# Patient Record
Sex: MALE | Employment: FULL TIME | URBAN - METROPOLITAN AREA
[De-identification: names, ages, dates, MRNs, and addresses within clinical notes are randomized per-mention and may not be internally consistent; named-entity substitution may affect disease eponyms.]

---

## 2016-07-20 LAB
CREATININE, EXTERNAL: 0.98
LDL-C, EXTERNAL: 124

## 2018-10-31 ENCOUNTER — OFFICE VISIT (OUTPATIENT)
Dept: INTERNAL MEDICINE CLINIC | Age: 38
End: 2018-10-31

## 2018-10-31 VITALS
OXYGEN SATURATION: 97 % | SYSTOLIC BLOOD PRESSURE: 110 MMHG | HEART RATE: 68 BPM | DIASTOLIC BLOOD PRESSURE: 80 MMHG | RESPIRATION RATE: 19 BRPM | WEIGHT: 191.2 LBS | BODY MASS INDEX: 30.01 KG/M2 | TEMPERATURE: 97.9 F | HEIGHT: 67 IN

## 2018-10-31 DIAGNOSIS — Z87.19 HISTORY OF FATTY INFILTRATION OF LIVER: ICD-10-CM

## 2018-10-31 DIAGNOSIS — Z13.1 DIABETES MELLITUS SCREENING: ICD-10-CM

## 2018-10-31 DIAGNOSIS — Z13.29 THYROID DISORDER SCREEN: ICD-10-CM

## 2018-10-31 DIAGNOSIS — Z13.220 LIPID SCREENING: ICD-10-CM

## 2018-10-31 DIAGNOSIS — Z76.89 ENCOUNTER TO ESTABLISH CARE: Primary | ICD-10-CM

## 2018-10-31 NOTE — PROGRESS NOTES
New Patient Evaluation Joe Griffin is a 34 y.o. male. They are here to establish care with the group and me as a primary care provider. He has not seen a physician in several years. Recently moved to [place] from [facility]. He had fatty liver in 2010. Triglyceride levels were high as well. This has not been rechecked in some time. He normally gets the flu shot. He is playing tennis 2-3 a week. Trying to work out more. He has a family history of gout (uncles) Patient Active Problem List  
 Diagnosis Date Noted  History of fatty infiltration of liver 10/31/2018 No Known Allergies History reviewed. No pertinent past medical history. History reviewed. No pertinent surgical history. Family History Problem Relation Age of Onset  Hypertension Mother  Liver Disease Father Social History Tobacco Use  Smoking status: Never Smoker  Smokeless tobacco: Never Used Substance Use Topics  Alcohol use: No  
  Frequency: Never Health Maintenance Topic Date Due  
 DTaP/Tdap/Td series (1 - Tdap) 07/14/1999  Influenza Age 5 to Adult  08/01/2018 Review of Systems Constitutional: Negative. Respiratory: Negative. Cardiovascular: Negative. Gastrointestinal: Negative. Visit Vitals /80 (BP 1 Location: Right arm, BP Patient Position: Sitting) Pulse 68 Temp 97.9 °F (36.6 °C) (Oral) Resp 19 Ht 5' 6.58\" (1.691 m) Wt 191 lb 3.2 oz (86.7 kg) SpO2 97% BMI 30.33 kg/m² Physical Exam  
Constitutional: No distress. Cardiovascular: Normal rate and regular rhythm. No murmur heard. Pulmonary/Chest: Effort normal and breath sounds normal.  
 
 
 
 
ASSESSMENT/PLAN Diagnoses and all orders for this visit: 1. Encounter to establish care 2. History of fatty infiltration of liver 3. Diabetes mellitus screening 
-     HEMOGLOBIN A1C WITH EAG 4.  Lipid screening 
-     CBC WITH AUTOMATED DIFF 
 -     METABOLIC PANEL, COMPREHENSIVE 
-     LIPID PANEL 5. Thyroid disorder screen 
-     TSH 3RD GENERATION Follow-up Disposition: 
Return in about 3 months (around 1/31/2019) for Full Physical - 30 minutes appointment. 
 
-Discussed with the patient to continue the current plan of care. We will obtain baseline labwork and determine if any adjustments need to be done. We will also await the records of the previous PCP to ascertain further details of the patient's history. The patient agrees with and understands the plan of care. All questions have been answered.

## 2019-01-22 LAB
ALBUMIN SERPL-MCNC: 4.6 G/DL (ref 3.5–5.5)
ALBUMIN/GLOB SERPL: 1.6 {RATIO} (ref 1.2–2.2)
ALP SERPL-CCNC: 105 IU/L (ref 39–117)
ALT SERPL-CCNC: 97 IU/L (ref 0–44)
AST SERPL-CCNC: 41 IU/L (ref 0–40)
BASOPHILS # BLD AUTO: 0 X10E3/UL (ref 0–0.2)
BASOPHILS NFR BLD AUTO: 1 %
BILIRUB SERPL-MCNC: 0.4 MG/DL (ref 0–1.2)
BUN SERPL-MCNC: 18 MG/DL (ref 6–24)
BUN/CREAT SERPL: 18 (ref 9–20)
CALCIUM SERPL-MCNC: 9.2 MG/DL (ref 8.7–10.2)
CHLORIDE SERPL-SCNC: 104 MMOL/L (ref 96–106)
CHOLEST SERPL-MCNC: 205 MG/DL (ref 100–199)
CO2 SERPL-SCNC: 25 MMOL/L (ref 20–29)
CREAT SERPL-MCNC: 0.98 MG/DL (ref 0.76–1.27)
EOSINOPHIL # BLD AUTO: 0.2 X10E3/UL (ref 0–0.4)
EOSINOPHIL NFR BLD AUTO: 2 %
ERYTHROCYTE [DISTWIDTH] IN BLOOD BY AUTOMATED COUNT: 13.9 % (ref 12.3–15.4)
EST. AVERAGE GLUCOSE BLD GHB EST-MCNC: 126 MG/DL
GLOBULIN SER CALC-MCNC: 2.9 G/DL (ref 1.5–4.5)
GLUCOSE SERPL-MCNC: 150 MG/DL (ref 65–99)
HBA1C MFR BLD: 6 % (ref 4.8–5.6)
HCT VFR BLD AUTO: 46.6 % (ref 37.5–51)
HDLC SERPL-MCNC: 28 MG/DL
HGB BLD-MCNC: 15.9 G/DL (ref 13–17.7)
IMM GRANULOCYTES # BLD AUTO: 0 X10E3/UL (ref 0–0.1)
IMM GRANULOCYTES NFR BLD AUTO: 0 %
LDLC SERPL CALC-MCNC: ABNORMAL MG/DL (ref 0–99)
LYMPHOCYTES # BLD AUTO: 2.3 X10E3/UL (ref 0.7–3.1)
LYMPHOCYTES NFR BLD AUTO: 33 %
MCH RBC QN AUTO: 29.7 PG (ref 26.6–33)
MCHC RBC AUTO-ENTMCNC: 34.1 G/DL (ref 31.5–35.7)
MCV RBC AUTO: 87 FL (ref 79–97)
MONOCYTES # BLD AUTO: 0.5 X10E3/UL (ref 0.1–0.9)
MONOCYTES NFR BLD AUTO: 8 %
NEUTROPHILS # BLD AUTO: 3.9 X10E3/UL (ref 1.4–7)
NEUTROPHILS NFR BLD AUTO: 56 %
PLATELET # BLD AUTO: 164 X10E3/UL (ref 150–379)
POTASSIUM SERPL-SCNC: 4.1 MMOL/L (ref 3.5–5.2)
PROT SERPL-MCNC: 7.5 G/DL (ref 6–8.5)
RBC # BLD AUTO: 5.35 X10E6/UL (ref 4.14–5.8)
SODIUM SERPL-SCNC: 142 MMOL/L (ref 134–144)
TRIGL SERPL-MCNC: 416 MG/DL (ref 0–149)
TSH SERPL DL<=0.005 MIU/L-ACNC: 1.9 UIU/ML (ref 0.45–4.5)
VLDLC SERPL CALC-MCNC: ABNORMAL MG/DL (ref 5–40)
WBC # BLD AUTO: 6.9 X10E3/UL (ref 3.4–10.8)

## 2019-01-29 ENCOUNTER — OFFICE VISIT (OUTPATIENT)
Dept: INTERNAL MEDICINE CLINIC | Age: 39
End: 2019-01-29

## 2019-01-29 VITALS
WEIGHT: 190.6 LBS | HEART RATE: 62 BPM | DIASTOLIC BLOOD PRESSURE: 90 MMHG | OXYGEN SATURATION: 97 % | RESPIRATION RATE: 17 BRPM | HEIGHT: 67 IN | SYSTOLIC BLOOD PRESSURE: 110 MMHG | BODY MASS INDEX: 29.91 KG/M2 | TEMPERATURE: 97.9 F

## 2019-01-29 DIAGNOSIS — Z00.00 WELL ADULT EXAM: Primary | ICD-10-CM

## 2019-01-29 DIAGNOSIS — Z87.19 HISTORY OF FATTY INFILTRATION OF LIVER: ICD-10-CM

## 2019-01-29 DIAGNOSIS — R73.03 PREDIABETES: ICD-10-CM

## 2019-01-29 DIAGNOSIS — E78.1 HYPERTRIGLYCERIDEMIA: ICD-10-CM

## 2019-01-29 NOTE — PROGRESS NOTES
Comprehensive Physical Examination    William High is a 34 y.o. male. he presents for a comprehensive physical examination. The patient reports feeling generally well today. He reports having tried to change his lifestyle in regards to diet and exercise. He has not completely made this transition however. We reviewed the lab work he had obtained prior to the visit. He has a very elevated triglyceride level in the 400s. He does have a history of hypertriglyceridemia. He also has a prediabetic A1c of 6.0. Given his history of fatty liver, he does have mild elevation of the transaminases. Other lab testing is within normal limits. Patient Active Problem List    Diagnosis Date Noted    History of fatty infiltration of liver 10/31/2018       No Known Allergies  History reviewed. No pertinent past medical history. History reviewed. No pertinent surgical history. Family History   Problem Relation Age of Onset    Hypertension Mother     Liver Disease Father      Social History     Tobacco Use    Smoking status: Never Smoker    Smokeless tobacco: Never Used   Substance Use Topics    Alcohol use: No     Frequency: Never        Health Maintenance   Topic Date Due    Influenza Age 5 to Adult  08/01/2018    DTaP/Tdap/Td series (2 - Td) 10/05/2026         Review of Systems   Constitutional: Negative. Respiratory: Negative. Cardiovascular: Negative. Gastrointestinal: Negative. Visit Vitals  /90 (BP 1 Location: Right arm, BP Patient Position: Sitting)   Pulse 62   Temp 97.9 °F (36.6 °C) (Oral)   Resp 17   Ht 5' 6.58\" (1.691 m)   Wt 190 lb 9.6 oz (86.5 kg)   SpO2 97%   BMI 30.23 kg/m²       Physical Exam   Constitutional: He is well-developed, well-nourished, and in no distress. HENT:   Mouth/Throat: Oropharynx is clear and moist.   Neck: Normal range of motion. Cardiovascular: Normal rate and regular rhythm. No murmur heard.   Pulmonary/Chest: Effort normal and breath sounds normal.   Abdominal: Soft. Bowel sounds are normal.   Musculoskeletal: He exhibits no edema. Lymphadenopathy:     He has no cervical adenopathy. ASSESSMENT/PLAN  Diagnoses and all orders for this visit:    1. Well adult exam    2. Prediabetes    3. History of fatty infiltration of liver    4. Hypertriglyceridemia    Discussed with the patient to continue healthy habits. He will minimize his intake of carbohydrates as well as attempt to increase his aerobic exercise. We discussed that this would help improve his fatty liver, his hyperglyceridemia as well as prediabetes. He understands this and will try to make these changes. Follow-up Disposition:  Return in about 6 months (around 7/29/2019) for Follow up.

## 2019-05-01 ENCOUNTER — OFFICE VISIT (OUTPATIENT)
Dept: INTERNAL MEDICINE CLINIC | Age: 39
End: 2019-05-01

## 2019-05-01 ENCOUNTER — HOSPITAL ENCOUNTER (OUTPATIENT)
Dept: GENERAL RADIOLOGY | Age: 39
Discharge: HOME | End: 2019-05-01
Attending: INTERNAL MEDICINE
Payer: COMMERCIAL

## 2019-05-01 VITALS
TEMPERATURE: 98.5 F | BODY MASS INDEX: 29.19 KG/M2 | OXYGEN SATURATION: 98 % | DIASTOLIC BLOOD PRESSURE: 80 MMHG | WEIGHT: 186 LBS | HEART RATE: 58 BPM | SYSTOLIC BLOOD PRESSURE: 108 MMHG | HEIGHT: 67 IN | RESPIRATION RATE: 19 BRPM

## 2019-05-01 DIAGNOSIS — M25.531 RIGHT WRIST PAIN: ICD-10-CM

## 2019-05-01 DIAGNOSIS — M25.531 RIGHT WRIST PAIN: Primary | ICD-10-CM

## 2019-05-01 PROCEDURE — 73110 X-RAY EXAM OF WRIST: CPT

## 2019-05-01 RX ORDER — FLUTICASONE PROPIONATE 50 MCG
2 SPRAY, SUSPENSION (ML) NASAL DAILY
COMMUNITY

## 2019-05-01 NOTE — PROGRESS NOTES
Pt reports hurting his right wrist about six weeks ago playing tennis. Pt hasn't been able to play since.

## 2019-05-01 NOTE — PROGRESS NOTES
Acute Care Note    Brayan Roldan is 34 y.o. male. he presents for evaluation of Wrist Injury    Wrist Pain  Patient complains of right wrist pain. The pain is mild, worsens with movement, and some relief by rest.  There is not associated numbness in the right hand. Pain has been present for 6 weeks. There is a history of overuse. He was playing a lot of tennis at the time of the worsening. Prior to Admission medications    Medication Sig Start Date End Date Taking? Authorizing Provider   fluticasone propionate (FLONASE ALLERGY RELIEF) 50 mcg/actuation nasal spray 2 Sprays by Both Nostrils route daily. Provider, Historical         Patient Active Problem List   Diagnosis Code    History of fatty infiltration of liver Z87.19         Review of Systems   Constitutional: Negative. Cardiovascular: Negative. Musculoskeletal: Positive for joint pain (as per HPI). Visit Vitals  /80 (BP 1 Location: Right arm, BP Patient Position: Sitting)   Pulse (!) 58   Temp 98.5 °F (36.9 °C) (Oral)   Resp 19   Ht 5' 6.58\" (1.691 m)   Wt 186 lb (84.4 kg)   SpO2 98%   BMI 29.50 kg/m²       Physical Exam   Musculoskeletal:        Right wrist: He exhibits tenderness (mild at the ventral aspect of the wrist.  ). He exhibits no swelling and no effusion. ASSESSMENT/PLAN  Diagnoses and all orders for this visit:    1. Right wrist pain - Discussed with the patient, this is likely due to overuse/sprain with his tennis playing. We will obtain and XR to evaluate further. Advised the patient to call back or return to office if symptoms worsen/change/persist.   Discussed expected course/resolution/complications of diagnosis in detail with patient. Medication risks/benefits/costs/interactions/alternatives discussed with patient. The patient was given an after visit summary which includes diagnoses, current medications, & vitals. They expressed understanding with the diagnosis and plan.

## 2019-07-19 ENCOUNTER — TELEPHONE (OUTPATIENT)
Dept: INTERNAL MEDICINE CLINIC | Age: 39
End: 2019-07-19

## 2019-07-19 DIAGNOSIS — E78.1 HYPERTRIGLYCERIDEMIA: ICD-10-CM

## 2019-07-19 DIAGNOSIS — R73.03 PREDIABETES: Primary | ICD-10-CM

## 2019-07-19 NOTE — TELEPHONE ENCOUNTER
Informed patient lab order ready at . Patient will come by office to complete. Pt verbalized understanding.

## 2019-07-24 LAB
ALBUMIN SERPL-MCNC: 4.4 G/DL (ref 3.5–5.5)
ALBUMIN/GLOB SERPL: 1.6 {RATIO} (ref 1.2–2.2)
ALP SERPL-CCNC: 82 IU/L (ref 39–117)
ALT SERPL-CCNC: 90 IU/L (ref 0–44)
AST SERPL-CCNC: 43 IU/L (ref 0–40)
BASOPHILS # BLD AUTO: 0.1 X10E3/UL (ref 0–0.2)
BASOPHILS NFR BLD AUTO: 1 %
BILIRUB SERPL-MCNC: 0.6 MG/DL (ref 0–1.2)
BUN SERPL-MCNC: 14 MG/DL (ref 6–24)
BUN/CREAT SERPL: 14 (ref 9–20)
CALCIUM SERPL-MCNC: 9.4 MG/DL (ref 8.7–10.2)
CHLORIDE SERPL-SCNC: 102 MMOL/L (ref 96–106)
CHOLEST SERPL-MCNC: 233 MG/DL (ref 100–199)
CO2 SERPL-SCNC: 25 MMOL/L (ref 20–29)
CREAT SERPL-MCNC: 1.02 MG/DL (ref 0.76–1.27)
EOSINOPHIL # BLD AUTO: 0.2 X10E3/UL (ref 0–0.4)
EOSINOPHIL NFR BLD AUTO: 2 %
ERYTHROCYTE [DISTWIDTH] IN BLOOD BY AUTOMATED COUNT: 13.4 % (ref 12.3–15.4)
EST. AVERAGE GLUCOSE BLD GHB EST-MCNC: 123 MG/DL
GLOBULIN SER CALC-MCNC: 2.8 G/DL (ref 1.5–4.5)
GLUCOSE SERPL-MCNC: 108 MG/DL (ref 65–99)
HBA1C MFR BLD: 5.9 % (ref 4.8–5.6)
HCT VFR BLD AUTO: 47.7 % (ref 37.5–51)
HDLC SERPL-MCNC: 36 MG/DL
HGB BLD-MCNC: 15.9 G/DL (ref 13–17.7)
IMM GRANULOCYTES # BLD AUTO: 0.1 X10E3/UL (ref 0–0.1)
IMM GRANULOCYTES NFR BLD AUTO: 2 %
LDLC SERPL CALC-MCNC: 157 MG/DL (ref 0–99)
LYMPHOCYTES # BLD AUTO: 2 X10E3/UL (ref 0.7–3.1)
LYMPHOCYTES NFR BLD AUTO: 33 %
MCH RBC QN AUTO: 29.3 PG (ref 26.6–33)
MCHC RBC AUTO-ENTMCNC: 33.3 G/DL (ref 31.5–35.7)
MCV RBC AUTO: 88 FL (ref 79–97)
MONOCYTES # BLD AUTO: 0.5 X10E3/UL (ref 0.1–0.9)
MONOCYTES NFR BLD AUTO: 8 %
NEUTROPHILS # BLD AUTO: 3.4 X10E3/UL (ref 1.4–7)
NEUTROPHILS NFR BLD AUTO: 54 %
PLATELET # BLD AUTO: 166 X10E3/UL (ref 150–450)
POTASSIUM SERPL-SCNC: 4.8 MMOL/L (ref 3.5–5.2)
PROT SERPL-MCNC: 7.2 G/DL (ref 6–8.5)
RBC # BLD AUTO: 5.42 X10E6/UL (ref 4.14–5.8)
SODIUM SERPL-SCNC: 141 MMOL/L (ref 134–144)
TRIGL SERPL-MCNC: 200 MG/DL (ref 0–149)
VLDLC SERPL CALC-MCNC: 40 MG/DL (ref 5–40)
WBC # BLD AUTO: 6.2 X10E3/UL (ref 3.4–10.8)

## 2019-07-30 ENCOUNTER — OFFICE VISIT (OUTPATIENT)
Dept: INTERNAL MEDICINE CLINIC | Age: 39
End: 2019-07-30

## 2019-07-30 VITALS
SYSTOLIC BLOOD PRESSURE: 110 MMHG | TEMPERATURE: 98.1 F | HEART RATE: 64 BPM | BODY MASS INDEX: 30.42 KG/M2 | OXYGEN SATURATION: 97 % | HEIGHT: 67 IN | DIASTOLIC BLOOD PRESSURE: 90 MMHG | WEIGHT: 193.8 LBS | RESPIRATION RATE: 17 BRPM

## 2019-07-30 DIAGNOSIS — E78.1 HYPERTRIGLYCERIDEMIA: Primary | ICD-10-CM

## 2019-07-30 DIAGNOSIS — E78.2 MIXED HYPERLIPIDEMIA: ICD-10-CM

## 2019-07-30 DIAGNOSIS — Z87.19 HISTORY OF FATTY INFILTRATION OF LIVER: ICD-10-CM

## 2019-07-30 DIAGNOSIS — M25.531 RIGHT WRIST PAIN: ICD-10-CM

## 2019-07-30 DIAGNOSIS — R73.03 PREDIABETES: ICD-10-CM

## 2019-07-30 NOTE — PROGRESS NOTES
Follow Up Visit    Nelly Rosado is a 37 y.o. male. he presents for Labs (review )    The patient returns for follow-up. He reports feeling generally well. His wrist is slightly improved after our visit in May. He played tennis yesterday and has some mild discomfort there. We reviewed his labs. He has an improved triglyceride level which is decreased to 200 from over 400. He also does have an elevated LDL. This is 157. Otherwise he has improved his HDL. His complete blood count is normal.  He does have evidence of mild liver enzyme elevation. This is thought secondary to his history of fatty infiltration of his liver. He has gained weight since his last visit however has not been exercising. Patient Active Problem List   Diagnosis Code    History of fatty infiltration of liver Z87.19         Prior to Admission medications    Medication Sig Start Date End Date Taking? Authorizing Provider   fluticasone propionate (FLONASE ALLERGY RELIEF) 50 mcg/actuation nasal spray 2 Sprays by Both Nostrils route daily. Yes Provider, Historical         Health Maintenance   Topic Date Due    Influenza Age 5 to Adult  08/01/2019    DTaP/Tdap/Td series (2 - Td) 10/05/2026    Pneumococcal 0-64 years  Aged Out       Review of Systems   Constitutional: Negative. Respiratory: Negative. Cardiovascular: Negative. Gastrointestinal: Negative. Visit Vitals  /90 (BP 1 Location: Left arm, BP Patient Position: Sitting)   Pulse 64   Temp 98.1 °F (36.7 °C) (Oral)   Resp 17   Ht 5' 6.58\" (1.691 m)   Wt 193 lb 12.8 oz (87.9 kg)   SpO2 97%   BMI 30.74 kg/m²       Physical Exam   Constitutional: No distress. Cardiovascular: Normal rate and regular rhythm. Pulmonary/Chest: Effort normal and breath sounds normal.   Musculoskeletal: He exhibits tenderness (mild wrist tenderness noted at the right wrist). ASSESSMENT/PLAN    Diagnoses and all orders for this visit:    1. Hypertriglyceridemia    2. Prediabetes    3. History of fatty infiltration of liver    4. Right wrist pain    5. Mixed hyperlipidemia      Follow-up and Dispositions    · Return in about 6 months (around 1/30/2020) for Full Physical - 30 minutes appointment.

## 2020-01-31 ENCOUNTER — OFFICE VISIT (OUTPATIENT)
Dept: INTERNAL MEDICINE CLINIC | Age: 40
End: 2020-01-31

## 2020-01-31 VITALS
RESPIRATION RATE: 14 BRPM | OXYGEN SATURATION: 97 % | HEART RATE: 64 BPM | HEIGHT: 67 IN | DIASTOLIC BLOOD PRESSURE: 84 MMHG | TEMPERATURE: 97.9 F | WEIGHT: 192.2 LBS | SYSTOLIC BLOOD PRESSURE: 122 MMHG | BODY MASS INDEX: 30.17 KG/M2

## 2020-01-31 DIAGNOSIS — R73.03 PREDIABETES: ICD-10-CM

## 2020-01-31 DIAGNOSIS — Z00.00 WELL ADULT EXAM: Primary | ICD-10-CM

## 2020-01-31 DIAGNOSIS — E78.2 MIXED HYPERLIPIDEMIA: ICD-10-CM

## 2020-01-31 NOTE — PROGRESS NOTES
Comprehensive Physical Examination    Adolph Monroe is a 37 y.o. male. he presents for a comprehensive physical examination. Cardiovascular Review  The patient has hyperlipidemia. He reports no chest pain on exertion, no dyspnea on exertion. He had previously improved this with changes to his diet and exercise. Diet and Lifestyle: generally follows a low fat low cholesterol diet, generally follows a low sodium diet, exercises sporadically. Lab review: orders written for new lab studies as appropriate; see orders. He otherwise, denies significant issues. He declines a flu shot. Patient Active Problem List    Diagnosis Date Noted    History of fatty infiltration of liver 10/31/2018     Current Outpatient Medications   Medication Sig Dispense Refill    fluticasone propionate (FLONASE ALLERGY RELIEF) 50 mcg/actuation nasal spray 2 Sprays by Both Nostrils route daily. No Known Allergies  History reviewed. No pertinent past medical history. History reviewed. No pertinent surgical history. Family History   Problem Relation Age of Onset    Hypertension Mother     Cancer Mother     Liver Disease Father      Social History     Tobacco Use    Smoking status: Never Smoker    Smokeless tobacco: Never Used   Substance Use Topics    Alcohol use: No     Frequency: Never        Health Maintenance   Topic Date Due    Influenza Age 5 to Adult  08/01/2019    DTaP/Tdap/Td series (2 - Td) 10/05/2026    Pneumococcal 0-64 years  Aged Out         Review of Systems   Constitutional: Negative. Respiratory: Negative. Cardiovascular: Negative. Gastrointestinal: Negative. Visit Vitals  /84 (BP 1 Location: Left arm, BP Patient Position: Sitting)   Pulse 64   Temp 97.9 °F (36.6 °C) (Oral)   Resp 14   Ht 5' 6.58\" (1.691 m)   Wt 192 lb 3.2 oz (87.2 kg)   SpO2 97%   BMI 30.48 kg/m²       Physical Exam  Constitutional:       Appearance: Normal appearance.    Cardiovascular:      Rate and Rhythm: Normal rate and regular rhythm. Pulmonary:      Effort: Pulmonary effort is normal.      Breath sounds: Normal breath sounds. Neurological:      General: No focal deficit present. Mental Status: He is alert and oriented to person, place, and time. ASSESSMENT/PLAN  Diagnoses and all orders for this visit:    1. Well adult exam  -     CBC WITH AUTOMATED DIFF    2. Mixed hyperlipidemia  -     LIPID PANEL  -     METABOLIC PANEL, COMPREHENSIVE    3. Prediabetes  -     HEMOGLOBIN A1C WITH EAG        Follow-up and Dispositions    · Return in about 6 months (around 7/31/2020), or if symptoms worsen or fail to improve.

## 2020-01-31 NOTE — PROGRESS NOTES
Chief Complaint   Patient presents with    Complete Physical     Reviewed record in preparation for visit and have obtained necessary documentation. Identified pt with two pt identifiers(name and ). Health Maintenance Due   Topic    Influenza Age 5 to Adult          Chief Complaint   Patient presents with    Complete Physical        Wt Readings from Last 3 Encounters:   20 192 lb 3.2 oz (87.2 kg)   19 193 lb 12.8 oz (87.9 kg)   19 186 lb (84.4 kg)     Temp Readings from Last 3 Encounters:   20 97.9 °F (36.6 °C) (Oral)   19 98.1 °F (36.7 °C) (Oral)   19 98.5 °F (36.9 °C) (Oral)     BP Readings from Last 3 Encounters:   20 122/84   19 110/90   19 108/80     Pulse Readings from Last 3 Encounters:   20 64   19 64   19 (!) 58           Learning Assessment:  :     Learning Assessment 10/31/2018   PRIMARY LEARNER Patient   HIGHEST LEVEL OF EDUCATION - PRIMARY LEARNER  4 YEARS OF COLLEGE   BARRIERS PRIMARY LEARNER NONE   PRIMARY LANGUAGE ENGLISH   LEARNER PREFERENCE PRIMARY DEMONSTRATION   ANSWERED BY pt   RELATIONSHIP SELF       Depression Screening:  :     3 most recent PHQ Screens 2020   Little interest or pleasure in doing things Not at all   Feeling down, depressed, irritable, or hopeless Not at all   Total Score PHQ 2 0       Fall Risk Assessment:  :     No flowsheet data found. Abuse Screening:  :     Abuse Screening Questionnaire 10/31/2018   Do you ever feel afraid of your partner? N   Are you in a relationship with someone who physically or mentally threatens you? N   Is it safe for you to go home?  Y       Coordination of Care Questionnaire:  :     1) Have you been to an emergency room, urgent care clinic since your last visit? no   Hospitalized since your last visit? no             2) Have you seen or consulted any other health care providers outside of [address] [place] [address] since your last visit? no  (Include any pap smears or colon screenings in this section.)    3) Do you have an Advance Directive on file? no    4) Are you interested in receiving information on Advance Directives? NO      Patient is accompanied by self I have received verbal consent from Pepper Valles to discuss any/all medical information while they are present in the room. Reviewed record  In preparation for visit and have obtained necessary documentation.

## 2020-02-01 LAB
ALBUMIN SERPL-MCNC: 4.9 G/DL (ref 4–5)
ALBUMIN/GLOB SERPL: 2 {RATIO} (ref 1.2–2.2)
ALP SERPL-CCNC: 93 IU/L (ref 39–117)
ALT SERPL-CCNC: 133 IU/L (ref 0–44)
AST SERPL-CCNC: 57 IU/L (ref 0–40)
BASOPHILS # BLD AUTO: 0.1 X10E3/UL (ref 0–0.2)
BASOPHILS NFR BLD AUTO: 1 %
BILIRUB SERPL-MCNC: 0.5 MG/DL (ref 0–1.2)
BUN SERPL-MCNC: 16 MG/DL (ref 6–24)
BUN/CREAT SERPL: 20 (ref 9–20)
CALCIUM SERPL-MCNC: 9.8 MG/DL (ref 8.7–10.2)
CHLORIDE SERPL-SCNC: 99 MMOL/L (ref 96–106)
CHOLEST SERPL-MCNC: 236 MG/DL (ref 100–199)
CO2 SERPL-SCNC: 26 MMOL/L (ref 20–29)
CREAT SERPL-MCNC: 0.82 MG/DL (ref 0.76–1.27)
EOSINOPHIL # BLD AUTO: 0.1 X10E3/UL (ref 0–0.4)
EOSINOPHIL NFR BLD AUTO: 2 %
ERYTHROCYTE [DISTWIDTH] IN BLOOD BY AUTOMATED COUNT: 13.2 % (ref 11.6–15.4)
EST. AVERAGE GLUCOSE BLD GHB EST-MCNC: 128 MG/DL
GLOBULIN SER CALC-MCNC: 2.4 G/DL (ref 1.5–4.5)
GLUCOSE SERPL-MCNC: 103 MG/DL (ref 65–99)
HBA1C MFR BLD: 6.1 % (ref 4.8–5.6)
HCT VFR BLD AUTO: 49.3 % (ref 37.5–51)
HDLC SERPL-MCNC: 33 MG/DL
HGB BLD-MCNC: 16.5 G/DL (ref 13–17.7)
IMM GRANULOCYTES # BLD AUTO: 0.1 X10E3/UL (ref 0–0.1)
IMM GRANULOCYTES NFR BLD AUTO: 2 %
LDLC SERPL CALC-MCNC: 159 MG/DL (ref 0–99)
LYMPHOCYTES # BLD AUTO: 2 X10E3/UL (ref 0.7–3.1)
LYMPHOCYTES NFR BLD AUTO: 30 %
MCH RBC QN AUTO: 29.1 PG (ref 26.6–33)
MCHC RBC AUTO-ENTMCNC: 33.5 G/DL (ref 31.5–35.7)
MCV RBC AUTO: 87 FL (ref 79–97)
MONOCYTES # BLD AUTO: 0.4 X10E3/UL (ref 0.1–0.9)
MONOCYTES NFR BLD AUTO: 6 %
NEUTROPHILS # BLD AUTO: 3.8 X10E3/UL (ref 1.4–7)
NEUTROPHILS NFR BLD AUTO: 59 %
PLATELET # BLD AUTO: 172 X10E3/UL (ref 150–450)
POTASSIUM SERPL-SCNC: 4.2 MMOL/L (ref 3.5–5.2)
PROT SERPL-MCNC: 7.3 G/DL (ref 6–8.5)
RBC # BLD AUTO: 5.67 X10E6/UL (ref 4.14–5.8)
SODIUM SERPL-SCNC: 142 MMOL/L (ref 134–144)
TRIGL SERPL-MCNC: 220 MG/DL (ref 0–149)
VLDLC SERPL CALC-MCNC: 44 MG/DL (ref 5–40)
WBC # BLD AUTO: 6.4 X10E3/UL (ref 3.4–10.8)

## 2020-09-30 ENCOUNTER — OFFICE VISIT (OUTPATIENT)
Dept: INTERNAL MEDICINE CLINIC | Age: 40
End: 2020-09-30
Payer: COMMERCIAL

## 2020-09-30 VITALS
HEART RATE: 72 BPM | WEIGHT: 188 LBS | TEMPERATURE: 97.8 F | DIASTOLIC BLOOD PRESSURE: 80 MMHG | BODY MASS INDEX: 29.51 KG/M2 | SYSTOLIC BLOOD PRESSURE: 110 MMHG | HEIGHT: 67 IN | RESPIRATION RATE: 14 BRPM | OXYGEN SATURATION: 97 %

## 2020-09-30 DIAGNOSIS — H92.01 RIGHT EAR PAIN: ICD-10-CM

## 2020-09-30 DIAGNOSIS — B07.0 PLANTAR WARTS: Primary | ICD-10-CM

## 2020-09-30 PROCEDURE — 99214 OFFICE O/P EST MOD 30 MIN: CPT | Performed by: INTERNAL MEDICINE

## 2020-09-30 NOTE — PROGRESS NOTES
Chief Complaint   Patient presents with    Warts     foot (right)    Ear Pain     right     Reviewed record in preparation for visit and have obtained necessary documentation. Identified pt with two pt identifiers(name and ). Health Maintenance Due   Topic    Flu Vaccine (1)         Chief Complaint   Patient presents with    Warts     foot (right)    Ear Pain     right        Wt Readings from Last 3 Encounters:   20 188 lb (85.3 kg)   20 192 lb 3.2 oz (87.2 kg)   19 193 lb 12.8 oz (87.9 kg)     Temp Readings from Last 3 Encounters:   20 97.8 °F (36.6 °C) (Temporal)   20 97.9 °F (36.6 °C) (Oral)   19 98.1 °F (36.7 °C) (Oral)     BP Readings from Last 3 Encounters:   20 110/80   20 122/84   19 110/90     Pulse Readings from Last 3 Encounters:   20 72   20 64   19 64           Learning Assessment:  :     Learning Assessment 10/31/2018   PRIMARY LEARNER Patient   HIGHEST LEVEL OF EDUCATION - PRIMARY LEARNER  4 YEARS OF COLLEGE   BARRIERS PRIMARY LEARNER NONE   PRIMARY LANGUAGE ENGLISH   LEARNER PREFERENCE PRIMARY DEMONSTRATION   ANSWERED BY pt   RELATIONSHIP SELF       Depression Screening:  :     3 most recent PHQ Screens 2020   Little interest or pleasure in doing things Not at all   Feeling down, depressed, irritable, or hopeless Not at all   Total Score PHQ 2 0       Fall Risk Assessment:  :     No flowsheet data found. Abuse Screening:  :     Abuse Screening Questionnaire 10/31/2018   Do you ever feel afraid of your partner? N   Are you in a relationship with someone who physically or mentally threatens you? N   Is it safe for you to go home?  Y       Coordination of Care Questionnaire:  :     1) Have you been to an emergency room, urgent care clinic since your last visit? no   Hospitalized since your last visit? no             2) Have you seen or consulted any other health care providers outside of [facility] since your last visit? no  (Include any pap smears or colon screenings in this section.)    3) Do you have an Advance Directive on file? no    4) Are you interested in receiving information on Advance Directives? NO      Patient is accompanied by self I have received verbal consent from Mana Hdz to discuss any/all medical information while they are present in the room. Reviewed record  In preparation for visit and have obtained necessary documentation.

## 2020-10-27 NOTE — PROGRESS NOTES
Acute Care Note    Laurent Rose is 41 y.o. male. he presents for evaluation of Warts (foot (right)) and Ear Pain (right)      The patient has noted some small lesions on his right foot. These are flesh colored and tend to be somewhat painful when walking. He has been using a wart remover on the area. He reports that the area tends to come back frequently. He also has some right ear pain that has been persistent over the past several days. No drainage, no fever or chills. He is able to hear. Prior to Admission medications    Medication Sig Start Date End Date Taking? Authorizing Provider   fluticasone propionate (FLONASE ALLERGY RELIEF) 50 mcg/actuation nasal spray 2 Sprays by Both Nostrils route daily. Provider, Historical         Patient Active Problem List   Diagnosis Code    History of fatty infiltration of liver Z87.19    Mixed hyperlipidemia E78.2         Review of Systems   Constitutional: Negative. Respiratory: Negative. Cardiovascular: Negative. Gastrointestinal: Negative. Musculoskeletal: Negative. Visit Vitals  /80 (BP 1 Location: Left arm, BP Patient Position: Sitting)   Pulse 72   Temp 97.8 °F (36.6 °C) (Temporal)   Resp 14   Ht 5' 6.58\" (1.691 m)   Wt 188 lb (85.3 kg)   SpO2 97%   BMI 29.82 kg/m²       Physical Exam  Constitutional:       Appearance: He is not ill-appearing. HENT:      Right Ear: Tympanic membrane, ear canal and external ear normal.      Left Ear: Tympanic membrane, ear canal and external ear normal.   Cardiovascular:      Rate and Rhythm: Normal rate and regular rhythm. Skin:     Comments: Noted lesion at the right foot, covered by bandage. Noted skin erosion under a wart removing pad   Neurological:      Mental Status: He is alert. ASSESSMENT/PLAN  Diagnoses and all orders for this visit:    1. Plantar warts - Given the skin erosions, advised he try duct tape instead of salicylic acid pads.   If not improvement, may need to involve derm vs podiatry    2. Right ear pain - No clear lesions, monitor for worsening. Advised the patient to call back or return to office if symptoms worsen/change/persist.   Discussed expected course/resolution/complications of diagnosis in detail with patient. Medication risks/benefits/costs/interactions/alternatives discussed with patient. The patient was given an after visit summary which includes diagnoses, current medications, & vitals. They expressed understanding with the diagnosis and plan.

## 2021-10-25 ENCOUNTER — OFFICE VISIT (OUTPATIENT)
Dept: INTERNAL MEDICINE CLINIC | Age: 41
End: 2021-10-25
Payer: COMMERCIAL

## 2021-10-25 VITALS
SYSTOLIC BLOOD PRESSURE: 128 MMHG | DIASTOLIC BLOOD PRESSURE: 84 MMHG | TEMPERATURE: 97.1 F | OXYGEN SATURATION: 97 % | HEART RATE: 55 BPM | WEIGHT: 193 LBS | RESPIRATION RATE: 16 BRPM | HEIGHT: 67 IN | BODY MASS INDEX: 30.29 KG/M2

## 2021-10-25 DIAGNOSIS — Z23 NEEDS FLU SHOT: ICD-10-CM

## 2021-10-25 DIAGNOSIS — B07.0 PLANTAR WART: ICD-10-CM

## 2021-10-25 DIAGNOSIS — S39.012A STRAIN OF LUMBAR REGION, INITIAL ENCOUNTER: Primary | ICD-10-CM

## 2021-10-25 PROCEDURE — 99213 OFFICE O/P EST LOW 20 MIN: CPT | Performed by: INTERNAL MEDICINE

## 2021-10-25 RX ORDER — CYCLOBENZAPRINE HCL 5 MG
5 TABLET ORAL
Qty: 30 TABLET | Refills: 0 | Status: SHIPPED | OUTPATIENT
Start: 2021-10-25

## 2021-10-25 RX ORDER — METHYLPREDNISOLONE 4 MG/1
4 TABLET ORAL
Qty: 1 DOSE PACK | Refills: 0 | Status: SHIPPED | OUTPATIENT
Start: 2021-10-25

## 2021-10-25 RX ORDER — IBUPROFEN 200 MG
TABLET ORAL
COMMUNITY

## 2021-10-25 NOTE — PROGRESS NOTES
Verified name and birth date for privacy precautions. Chart reviewed in preparation for today's visit. Chief Complaint   Patient presents with    Back Pain          Health Maintenance Due   Topic    Hepatitis C Screening     COVID-19 Vaccine (1)    Flu Vaccine (1)         Wt Readings from Last 3 Encounters:   10/25/21 193 lb (87.5 kg)   09/30/20 188 lb (85.3 kg)   01/31/20 192 lb 3.2 oz (87.2 kg)     Temp Readings from Last 3 Encounters:   10/25/21 97.1 °F (36.2 °C) (Temporal)   09/30/20 97.8 °F (36.6 °C) (Temporal)   01/31/20 97.9 °F (36.6 °C) (Oral)     BP Readings from Last 3 Encounters:   10/25/21 128/84   09/30/20 110/80   01/31/20 122/84     Pulse Readings from Last 3 Encounters:   10/25/21 (!) 55   09/30/20 72   01/31/20 64         Learning Assessment:  :     Learning Assessment 10/31/2018   PRIMARY LEARNER Patient   HIGHEST LEVEL OF EDUCATION - PRIMARY LEARNER  4 YEARS OF COLLEGE   BARRIERS PRIMARY LEARNER NONE   PRIMARY LANGUAGE ENGLISH   LEARNER PREFERENCE PRIMARY DEMONSTRATION   ANSWERED BY pt   RELATIONSHIP SELF       Depression Screening:  :     3 most recent PHQ Screens 10/25/2021   Little interest or pleasure in doing things Not at all   Feeling down, depressed, irritable, or hopeless Not at all   Total Score PHQ 2 0       Fall Risk Assessment:  :     No flowsheet data found. Abuse Screening:  :     Abuse Screening Questionnaire 10/25/2021 10/31/2018   Do you ever feel afraid of your partner? N N   Are you in a relationship with someone who physically or mentally threatens you? N N   Is it safe for you to go home?  Jessica Belcher

## 2021-10-25 NOTE — PROGRESS NOTES
Acute Care Note    Baljit Kumar is 35 y.o. male. he presents for evaluation of Back Pain      Back Pain  Patient presents for presents evaluation of low back problems. Symptoms have been present for 5 days and include stiffness in his lower back associated with pain. Initial inciting event: Startled by a bug on Tues. Wed morning had increased pain also with difficulty/pain on getting out of bed. . Symptoms are worst: morning. Alleviating factors identifiable by patient are recumbency, medication ibuprofen. Exacerbating factors identifiable by patient are standing, bending forwards. Treatments so far initiated by patient: ibuprofen. Previous lower back problems: none. Previous workup: none. He also has a plantar wart he would like to see podiatry about. Prior to Admission medications    Medication Sig Start Date End Date Taking? Authorizing Provider   ibuprofen (MOTRIN) 200 mg tablet Take  by mouth. Yes Provider, Historical   fluticasone propionate (FLONASE ALLERGY RELIEF) 50 mcg/actuation nasal spray 2 Sprays by Both Nostrils route daily. Patient not taking: Reported on 10/25/2021    Provider, Historical         Patient Active Problem List   Diagnosis Code    History of fatty infiltration of liver Z87.19    Mixed hyperlipidemia E78.2         Review of Systems   Constitutional: Negative. Musculoskeletal: Positive for back pain. Neurological: Positive for focal weakness (mild at his left foot). Negative for sensory change. Visit Vitals  /84 (BP 1 Location: Left arm, BP Patient Position: Sitting, BP Cuff Size: Large adult)   Pulse (!) 55   Temp 97.1 °F (36.2 °C) (Temporal)   Resp 16   Ht 5' 6.5\" (1.689 m)   Wt 193 lb (87.5 kg)   SpO2 97%   BMI 30.68 kg/m²       Physical Exam  Constitutional:       Appearance: Normal appearance. Musculoskeletal:         General: No swelling or tenderness.       Comments: Positive straight leg raise on the left   Neurological:      Mental Status: He is alert. ASSESSMENT/PLAN  Diagnoses and all orders for this visit:    1. Strain of lumbar region, initial encounter - Will try steroids and muscle relaxants. If not improving, we will refer to ortho/spine.    -     methylPREDNISolone (MEDROL DOSEPACK) 4 mg tablet; Take 1 Tablet by mouth Specific Days and Specific Times.  -     cyclobenzaprine (FLEXERIL) 5 mg tablet; Take 1 Tablet by mouth three (3) times daily as needed for Muscle Spasm(s). 2. Needs flu shot    3. Plantar wart  -     REFERRAL TO PODIATRY         Advised the patient to call back or return to office if symptoms worsen/change/persist.   Discussed expected course/resolution/complications of diagnosis in detail with patient. Medication risks/benefits/costs/interactions/alternatives discussed with patient. The patient was given an after visit summary which includes diagnoses, current medications, & vitals. They expressed understanding with the diagnosis and plan.

## 2021-10-25 NOTE — PATIENT INSTRUCTIONS
Vaccine Information Statement    Influenza (Flu) Vaccine (Inactivated or Recombinant): What You Need to Know    Many vaccine information statements are available in [place] and other languages. See www.immunize.org/vis. Hojas de información sobre vacunas están disponibles en español y en muchos otros idiomas. Visite www.immunize.org/vis. 1. Why get vaccinated? Influenza vaccine can prevent influenza (flu). Flu is a contagious disease that spreads around the [facility] every year, usually between October and May. Anyone can get the flu, but it is more dangerous for some people. Infants and young children, people 72 years and older, pregnant people, and people with certain health conditions or a weakened immune system are at greatest risk of flu complications. Pneumonia, bronchitis, sinus infections, and ear infections are examples of flu-related complications. If you have a medical condition, such as heart disease, cancer, or diabetes, flu can make it worse. Flu can cause fever and chills, sore throat, muscle aches, fatigue, cough, headache, and runny or stuffy nose. Some people may have vomiting and diarrhea, though this is more common in children than adults. In an average year, thousands of people in the [facility] die from flu, and many more are hospitalized. Flu vaccine prevents millions of illnesses and flu-related visits to the doctor each year. 2. Influenza vaccines     CDC recommends everyone 6 months and older get vaccinated every flu season. Children 6 months through 6years of age may need 2 doses during a single flu season. Everyone else needs only 1 dose each flu season. It takes about 2 weeks for protection to develop after vaccination. There are many flu viruses, and they are always changing. Each year a new flu vaccine is made to protect against the influenza viruses believed to be likely to cause disease in the upcoming flu season.  Even when the vaccine doesnt exactly match these viruses, it may still provide some protection. Influenza vaccine does not cause flu. Influenza vaccine may be given at the same time as other vaccines. 3. Talk with your health care provider    Tell your vaccination provider if the person getting the vaccine:   Has had an allergic reaction after a previous dose of influenza vaccine, or has any severe, life-threatening allergies    Has ever had Guillain-Barré Syndrome (also called GBS)    In some cases, your health care provider may decide to postpone influenza vaccination until a future visit. Influenza vaccine can be administered at any time during pregnancy. People who are or will be pregnant during influenza season should receive inactivated influenza vaccine. People with minor illnesses, such as a cold, may be vaccinated. People who are moderately or severely ill should usually wait until they recover before getting influenza vaccine. Your health care provider can give you more information. 4. Risks of a vaccine reaction     Soreness, redness, and swelling where the shot is given, fever, muscle aches, and headache can happen after influenza vaccination.  There may be a very small increased risk of Guillain-Barré Syndrome (GBS) after inactivated influenza vaccine (the flu shot). [place] children who get the flu shot along with pneumococcal vaccine (PCV13) and/or DTaP vaccine at the same time might be slightly more likely to have a seizure caused by fever. Tell your health care provider if a child who is getting flu vaccine has ever had a seizure. People sometimes faint after medical procedures, including vaccination. Tell your provider if you feel dizzy or have vision changes or ringing in the ears. As with any medicine, there is a very remote chance of a vaccine causing a severe allergic reaction, other serious injury, or death. 5. What if there is a serious problem?     An allergic reaction could occur after the vaccinated person leaves the clinic. If you see signs of a severe allergic reaction (hives, swelling of the face and throat, difficulty breathing, a fast heartbeat, dizziness, or weakness), call 9-1-1 and get the person to the nearest hospital.    For other signs that concern you, call your health care provider. Adverse reactions should be reported to the Vaccine Adverse Event Reporting System (VAERS). Your health care provider will usually file this report, or you can do it yourself. Visit the VAERS website at www.vaers. [facility].gov or call 4-109.985.3513. VAERS is only for reporting reactions, and VAERS staff members do not give medical advice. 6. The National Vaccine Injury Compensation Program    The [facility] Vaccine Injury Compensation Program (VICP) is a federal program that was created to compensate people who may have been injured by certain vaccines. Claims regarding alleged injury or death due to vaccination have a time limit for filing, which may be as short as two years. Visit the VICP website at www.[facility].gov/vaccinecompensation or call 6-286.324.3568 to learn about the program and about filing a claim. 7. How can I learn more?  Ask your health care provider.  Call your local or state health department.  Visit the website of the Food and Drug Administration (FDA) for vaccine package inserts and additional information at www.fda.gov/vaccines-blood-biologics/vaccines.  Contact the Centers for Disease Control and Prevention (CDC):  - Call 1-642.268.6568 (1-800-CDC-INFO) or  - Visit CDCs influenza website at www.cdc.gov/flu. Vaccine Information Statement   Inactivated Influenza Vaccine   8/6/2021  40 JULIANO Marcelo [id number]   Department of Health and Human Services  Centers for Disease Control and Prevention    Office Use Only         Low Back Pain: Exercises  Introduction  Here are some examples of exercises for you to try.  The exercises may be suggested for a condition or for rehabilitation. Start each exercise slowly. Ease off the exercises if you start to have pain. You will be told when to start these exercises and which ones will work best for you. How to do the exercises  Press-up    1. Lie on your stomach, supporting your body with your forearms. 2. Press your elbows down into the floor to raise your upper back. As you do this, relax your stomach muscles and allow your back to arch without using your back muscles. As your press up, do not let your hips or pelvis come off the floor. 3. Hold for 15 to 30 seconds, then relax. 4. Repeat 2 to 4 times. Alternate arm and leg (bird dog) exercise    Do this exercise slowly. Try to keep your body straight at all times, and do not let one hip drop lower than the other. 1. Start on the floor, on your hands and knees. 2. Tighten your belly muscles. 3. Raise one leg off the floor, and hold it straight out behind you. Be careful not to let your hip drop down, because that will twist your trunk. 4. Hold for about 6 seconds, then lower your leg and switch to the other leg. 5. Repeat 8 to 12 times on each leg. 6. Over time, work up to holding for 10 to 30 seconds each time. 7. If you feel stable and secure with your leg raised, try raising the opposite arm straight out in front of you at the same time. Knee-to-chest exercise    1. Lie on your back with your knees bent and your feet flat on the floor. 2. Bring one knee to your chest, keeping the other foot flat on the floor (or keeping the other leg straight, whichever feels better on your lower back). 3. Keep your lower back pressed to the floor. Hold for at least 15 to 30 seconds. 4. Relax, and lower the knee to the starting position. 5. Repeat with the other leg. Repeat 2 to 4 times with each leg. 6. To get more stretch, put your other leg flat on the floor while pulling your knee to your chest.  Curl-ups    1.  Lie on the floor on your back with your knees bent at a 90-degree angle. Your feet should be flat on the floor, about 12 inches from your buttocks. 2. Cross your arms over your chest. If this bothers your neck, try putting your hands behind your neck (not your head), with your elbows spread apart. 3. Slowly tighten your belly muscles and raise your shoulder blades off the floor. 4. Keep your head in line with your body, and do not press your chin to your chest.  5. Hold this position for 1 or 2 seconds, then slowly lower yourself back down to the floor. 6. Repeat 8 to 12 times. Pelvic tilt exercise    1. Lie on your back with your knees bent. 2. \"Brace\" your stomach. This means to tighten your muscles by pulling in and imagining your belly button moving toward your spine. You should feel like your back is pressing to the floor and your hips and pelvis are rocking back. 3. Hold for about 6 seconds while you breathe smoothly. 4. Repeat 8 to 12 times. Heel dig bridging    1. Lie on your back with both knees bent and your ankles bent so that only your heels are digging into the floor. Your knees should be bent about 90 degrees. 2. Then push your heels into the floor, squeeze your buttocks, and lift your hips off the floor until your shoulders, hips, and knees are all in a straight line. 3. Hold for about 6 seconds as you continue to breathe normally, and then slowly lower your hips back down to the floor and rest for up to 10 seconds. 4. Do 8 to 12 repetitions. Hamstring stretch in doorway    1. Lie on your back in a doorway, with one leg through the open door. 2. Slide your leg up the wall to straighten your knee. You should feel a gentle stretch down the back of your leg. 3. Hold the stretch for at least 15 to 30 seconds. Do not arch your back, point your toes, or bend either knee. Keep one heel touching the floor and the other heel touching the wall. 4. Repeat with your other leg. 5. Do 2 to 4 times for each leg. Hip flexor stretch    1.  Kneel on the floor with one knee bent and one leg behind you. Place your forward knee over your foot. Keep your other knee touching the floor. 2. Slowly push your hips forward until you feel a stretch in the upper thigh of your rear leg. 3. Hold the stretch for at least 15 to 30 seconds. Repeat with your other leg. 4. Do 2 to 4 times on each side. Wall sit    1. Stand with your back 10 to 12 inches away from a wall. 2. Lean into the wall until your back is flat against it. 3. Slowly slide down until your knees are slightly bent, pressing your lower back into the wall. 4. Hold for about 6 seconds, then slide back up the wall. 5. Repeat 8 to 12 times. Follow-up care is a key part of your treatment and safety. Be sure to make and go to all appointments, and call your doctor if you are having problems. It's also a good idea to know your test results and keep a list of the medicines you take. Where can you learn more? Go to http://www.small.com/  Enter Q629 in the search box to learn more about \"Low Back Pain: Exercises. \"  Current as of: July 1, 2021               Content Version: 13.0  © 7677-4572 [employer]. Care instructions adapted under license by [employer] (which disclaims liability or warranty for this information). If you have questions about a medical condition or this instruction, always ask your healthcare professional. [place] [address] any warranty or liability for your use of this information.

## 2022-03-19 PROBLEM — E78.2 MIXED HYPERLIPIDEMIA: Status: ACTIVE | Noted: 2020-01-31

## 2022-03-19 PROBLEM — Z87.19 HISTORY OF FATTY INFILTRATION OF LIVER: Status: ACTIVE | Noted: 2018-10-31

## 2023-05-05 ENCOUNTER — TELEPHONE (OUTPATIENT)
Dept: INTERNAL MEDICINE CLINIC | Age: 43
End: 2023-05-05

## 2023-05-21 RX ORDER — CYCLOBENZAPRINE HCL 5 MG
5 TABLET ORAL 3 TIMES DAILY PRN
COMMUNITY
Start: 2021-10-25

## 2023-05-21 RX ORDER — IBUPROFEN 200 MG
TABLET ORAL
COMMUNITY

## 2023-05-21 RX ORDER — FLUTICASONE PROPIONATE 50 MCG
2 SPRAY, SUSPENSION (ML) NASAL DAILY
COMMUNITY

## 2023-05-21 RX ORDER — METHYLPREDNISOLONE 4 MG/1
4 TABLET ORAL
COMMUNITY
Start: 2021-10-25